# Patient Record
Sex: FEMALE | Race: WHITE
[De-identification: names, ages, dates, MRNs, and addresses within clinical notes are randomized per-mention and may not be internally consistent; named-entity substitution may affect disease eponyms.]

---

## 2021-02-21 ENCOUNTER — HOSPITAL ENCOUNTER (EMERGENCY)
Dept: HOSPITAL 63 - ER | Age: 41
Discharge: HOME | End: 2021-02-21
Payer: SELF-PAY

## 2021-02-21 VITALS — HEIGHT: 65 IN | WEIGHT: 125.66 LBS | BODY MASS INDEX: 20.94 KG/M2

## 2021-02-21 VITALS — DIASTOLIC BLOOD PRESSURE: 70 MMHG | SYSTOLIC BLOOD PRESSURE: 123 MMHG

## 2021-02-21 DIAGNOSIS — F41.9: Primary | ICD-10-CM

## 2021-02-21 DIAGNOSIS — Z76.0: ICD-10-CM

## 2021-02-21 DIAGNOSIS — F32.9: ICD-10-CM

## 2021-02-21 PROCEDURE — 99283 EMERGENCY DEPT VISIT LOW MDM: CPT

## 2021-02-21 NOTE — PHYS DOC
Past History


Past Medical History:  Anxiety, Depression


Past Surgical History:  No Surgical History


Alcohol Use:  None





Adult General


Chief Complaint


Chief Complaint:  ANXIETY/PANIC ATTACK





HPI


HPI





Patient is a 40-year-old female presents emergency department complaining of 

anxiety and requesting her anxiety medication Celexa be refilled.  Patient 

states that she has been out of Celexa for several months related to loss of job

if she could not afford it.  Patient states she has a job now and can afford to 

fill her medications.  Patient states she used to live in [place] and see a 

psychiatrist in [place] that would prescribe her medication, patient states 

that she lives in [place] and [facility]

ation and medication consideration on 03/08/2021.  Patient denies homicidal 

suicidal ideations.  Patient denies chest pain, shortness of breath, visual 

changes, neurological problems, cough or congestion.  Patient denies any other 

physical complaints or physical concerns.





Review of Systems


Review of Systems





14 body systems of review of systems have been reviewed.  See HPI for pertinent 

positives and negative responses, otherwise all other systems are negative, 

nonpertinent or noncontributory.





Current Medications


Current Medications





Current Medications








 Medications


  (Trade)  Dose


 Ordered  Sig/Linda  Start Time


 Stop Time Status Last Admin


Dose Admin


 


 Lorazepam


  (Ativan)  1 mg  1X  ONCE  2/21/21 15:45


 2/21/21 15:46 UNV  














Physical Exam


Physical Exam





Constitutional: Well developed, well nourished, no acute distress, non-toxic 

appearance. 


HENT: Normocephalic, atraumatic, bilateral external ears normal, oropharynx 

moist, no oral exudates, nose normal. 


Eyes: PERRLA, EOMI, conjunctiva normal, no discharge.  


Neck: Normal range of motion, no tenderness, supple, no stridor.  


Cardiovascular:Heart rate regular rhythm, no murmur, heart sounds S1-S2 

auscultation.


Lungs & Thorax:  Bilateral breath sounds clear to auscultation all lung fields.


Abdomen: Bowel sounds normal, soft, no tenderness, no masses, no pulsatile 

masses.  


Skin: Warm, dry, no erythema, no rash.  


Back: No tenderness, no CVA tenderness.  


Extremities: No tenderness, no cyanosis, no clubbing, ROM intact, no edema.  


Neurologic: Alert and oriented X 3, normal motor function, normal sensory 

function, no focal deficits noted. 


Psychologic: Affect normal, judgement normal, mood normal.





Current Patient Data


Vital Signs





                                   Vital Signs








  Date Time  Temp Pulse Resp B/P (MAP) Pulse Ox O2 Delivery O2 Flow Rate FiO2


 


2/21/21 15:08 98.5 76 20 123/70 (87) 98 Room Air  











EKG


EKG


[]





Radiology/Procedures


Radiology/Procedures


[]





Heart Score


Risk Factors:


Risk Factors:  DM, Current or recent (<one month) smoker, HTN, HLP, family 

history of CAD, obesity.


Risk Scores:


Risk Factors:  DM, Current or recent (<one month) smoker, HTN, HLP, family hist

ory of CAD, obesity.





Course & Med Decision Making


Course & Med Decision Making


Pertinent Labs and Imaging studies reviewed. (See chart for details)





40-year-old female, vital signs reviewed, presents emergency department for 

refill of her Celexa.  Patient has appointment to [facility].  We will give 30-day supply of 20 mg Celexa p.o. 

daily, will give 1 mg p.o. Ativan in emergency department.





Patient gave verbal understanding of discharge home instructions, will keep 

appointment with [facility] for future prescriptions for her psychiatric 

medications, gave verbal understanding return to ER precautions or concerns, was

 discharged home without incident.





Dragon Disclaimer


Dragon Disclaimer


This electronic medical record was generated, in whole or in part, using a voice

 recognition dictation system.





Departure


Departure:


Impression:  


   Primary Impression:  


   Anxiety


   Additional Impression:  


   Encounter for medication refill


Disposition:  01 DC HOME SELF CARE/HOMELESS


Condition:  GOOD


Referrals:  


PCP,UNKNOWN (PCP)





Additional Instructions:  


Take medications as prescribed, please keep your appointment with the [facility] for ongoing prescription refill of your Celexa.  Return to the emergency 

department for worsening symptoms or other concerns.





EMERGENCY DEPARTMENT GENERAL DISCHARGE INSTRUCTIONS





Thank you for coming to [facility] (ED) today and trusting us

 with you 


care.  We trust that you had a positivie experience in our Emergency Department.

  If you 


wish to speak to the department management, you may call the director at 

[phone].





YOUR FOLLOW UP INSTRUCTIONS ARE AS FOLLOWS:





1.  Do you have a private Doctor?  If you do not have a private doctor, please 

ask for a 


resource list of physicians or clinics that may be able to assist you with 

follow up care.





2.  The Emergency Physician has interpreted your x-rays.  The X-Ray specialist 

will also 


review them.  If there is a change in the findings, you will be notified in 48 

hours when at 


all possible.





3.  A lab test or culture has been done, your results will be reviewed and you 

will be 


notified if you need a change in treatment.





ADDITIONAL INSTRUCTIONS AND INFORMATION:





1.  Your care today has been supervised by a physician who is specially trained 

in emergency 


care.  Many problems require more than one evaluation for a complete diagnosis 

and 


treatment.  We recommend that you schedule your follow up appointment as 

recommended to 


ensure complete treatment of you illness or injury.  If you are unable to obtain

 follow up 


care and continue to have a problem, or if your condition worsens, we recommend 

that you 


return to the ED.





2.  We are not able to safely determine your condition over the phone nor are we

 able to 


give sound medical advice over the phone.  For these safety reasons, if you call

 for medical 


advice we will ask you to come to the ED for further evaluation.





3.  If you have any questions regarding these discharge instructions please call

 the ED at 


[phone].





SAFETY INFORMATION:





In the interest of safety, wellness, and injury prevention; we encourage you to 

wear your 


sealbelt, if you smoke; quite smoking, and we encourage family to use a 

protective helmet 


for bicycling and other sporting events that present an increased risk for head 

injury.





IF YOUR SYMPTOMS WORSEN OR NEW SYMPTOMS DEVELOP, OR YOU HAVE CONCERNS ABOUT YOUR

 CONDITION; 


OR IF YOUR CONDITION WORSENS WHILE YOU ARE WAITING FOR YOUR FOLLOW UP 

APPOINTMENT; EITHER 


CONTACT YOUR PRIMARY CARE DOCTOR, THE PHYSICIAN WHOSE NAME AND NUMBER YOU WERE 

GIVEN, OR 


RETURN TO THE ED IMMEDIATELY.


Scripts


Citalopram Hydrobromide (CELEXA) 20 Mg Tablet


1 TAB PO DAILY for ANXIETY, #30 TAB 0 Refills


   Prov: ORALIA ESTRADA         2/21/21





Problem Qualifiers











ORALIA ESTRADA       Feb 21, 2021 15:55

## 2021-02-28 ENCOUNTER — HOSPITAL ENCOUNTER (EMERGENCY)
Dept: HOSPITAL 63 - ER | Age: 41
Discharge: HOME | End: 2021-02-28
Payer: SELF-PAY

## 2021-02-28 VITALS — WEIGHT: 145.51 LBS | BODY MASS INDEX: 24.24 KG/M2 | HEIGHT: 65 IN

## 2021-02-28 VITALS — DIASTOLIC BLOOD PRESSURE: 65 MMHG | SYSTOLIC BLOOD PRESSURE: 124 MMHG

## 2021-02-28 DIAGNOSIS — F32.9: ICD-10-CM

## 2021-02-28 DIAGNOSIS — F41.9: Primary | ICD-10-CM

## 2021-02-28 DIAGNOSIS — Z76.0: ICD-10-CM

## 2021-02-28 LAB
AMPHETAMINE/METHAMPHETAMINE: (no result)
APTT PPP: YELLOW S
BACTERIA #/AREA URNS HPF: (no result) /HPF
BARBITURATES UR-MCNC: (no result) UG/ML
BENZODIAZ UR-MCNC: (no result) UG/L
BILIRUB UR QL STRIP: (no result)
CANNABINOIDS UR-MCNC: (no result) UG/L
COCAINE UR-MCNC: (no result) NG/ML
FIBRINOGEN PPP-MCNC: CLEAR MG/DL
GLUCOSE UR STRIP-MCNC: (no result) MG/DL
METHADONE SERPL-MCNC: (no result) NG/ML
NITRITE UR QL STRIP: (no result)
OPIATES UR-MCNC: (no result) NG/ML
PCP SERPL-MCNC: (no result) MG/DL
RBC #/AREA URNS HPF: (no result) /HPF (ref 0–2)
SP GR UR STRIP: 1.02
SQUAMOUS #/AREA URNS LPF: (no result) /LPF
UROBILINOGEN UR-MCNC: 0.2 MG/DL
WBC #/AREA URNS HPF: (no result) /HPF (ref 0–4)

## 2021-02-28 PROCEDURE — 81001 URINALYSIS AUTO W/SCOPE: CPT

## 2021-02-28 PROCEDURE — 81025 URINE PREGNANCY TEST: CPT

## 2021-02-28 PROCEDURE — 36415 COLL VENOUS BLD VENIPUNCTURE: CPT

## 2021-02-28 PROCEDURE — 80307 DRUG TEST PRSMV CHEM ANLYZR: CPT

## 2021-02-28 PROCEDURE — 99284 EMERGENCY DEPT VISIT MOD MDM: CPT

## 2021-02-28 NOTE — PHYS DOC
Past History


Past Medical History:  Anxiety, Depression


Past Surgical History:  No Surgical History


Alcohol Use:  None





General Adult


EDM:


Chief Complaint:  ANXIETY/PANIC ATTACK





HPI:


HPI:


"I was here the other day for depression anxiety.... Back on the 21st.... They 

did give me a prescription for Celexa.... I have used it before when I get 

depression and anxiety.... However I have not filled it because of financial 

issues.... I do plan to fill it here this coming Tuesday when I get paid.... It 

just been having more anxiety today... I know that is what is  going on ..I 

would  like to get treatment for my anxiety here in the ER... or at least one 

tablet...of Celexa.."








Patient is a 40 year old female who presents with with complaints of anxiety.  

Patient recently ran out of her Celexa.  Was seen on 221 and did receive a 30-

day supply prescription for Celexa.  Patient has not filled this prescription as

yet.  [facility].  Does have a follow-up 

appointment on 3/8/2021.  Patient denies any suicidal ideations.  Patient denies

any chest pain, dyspnea, hallucinations, delusions, fever, chills, pain.  

Patient states at times when she has anxiety attacks she seems like her heart 

races.  Currently she is in sinus rhythm on monitor.  At a rate of approximately

65-70.  No acute morphology noted on monitor.  Patient requesting a dose of 

Celexa until she can follow-up tomorrow at the counseling center and see if her 

appointment could be moved up.  Patient denies any illicit drugs.  No history of

recent travel but has moved from [place] to the [facility] border.





Review of Systems:


Review of Systems:


Constitutional:  Denies fever or chills 


Eyes:  Denies change in visual acuity 


HENT:  Denies nasal congestion or sore throat 


Respiratory:  Denies cough or shortness of breath 


Cardiovascular:  Denies chest pain or edema 


GI:  Denies abdominal pain, nausea, vomiting, bloody stools or diarrhea 


: Denies dysuria 


Musculoskeletal:  Denies back pain or joint pain 


Integument:  Denies rash 


Neurologic:  Denies headache, focal weakness or sensory changes 


Endocrine:  Denies polyuria or polydipsia 


Lymphatic:  Denies swollen glands 


Psychiatric: History of depression and anxiety.  Denies any suicidal ideation.  

Denies any homicidal ideation.  Denies any hallucinations or delusions.





Family History:


Family History:


Noncontributory to presentation





Current Medications:


Current Meds:





Current Medications








 Medications


  (Trade)  Dose


 Ordered  Sig/Linda  Start Time


 Stop Time Status Last Admin


Dose Admin


 


 Citalopram


 Hydrobromide


  (CeleXA)  20 mg  1X  ONCE  2/28/21 18:30


 2/28/21 18:32 DC  














Allergies:


Allergies:





Allergies








Coded Allergies Type Severity Reaction Last Updated Verified


 


  No Known Drug Allergies    2/21/21 No











Physical Exam:


PE:





Constitutional: no acute distress, non-toxic appearance. []


HENT: Normocephalic, atraumatic, bilateral external ears normal, oropharynx 

moist, no oral exudates, nose normal. []


Eyes: PERRLA, EOMI, conjunctiva normal, no discharge.  Strabismus .


Neck: Normal range of motion, no tenderness, supple, no stridor. [] 


Cardiovascular:Heart rate regular rhythm, no murmur []


Lungs & Thorax:  Bilateral breath sounds equal apex on auscultation []


Abdomen: Bowel sounds normal, soft, no tenderness, no masses, no pulsatile 

masses. [] 


Skin: Warm, dry, no erythema, no rash. [] 


Back: No tenderness, no CVA tenderness. [] 


Extremities: No tenderness, no cyanosis, no clubbing, ROM intact, no edema. [] 


Neurologic: Alert and oriented X 3, moves all extremities on request, does have 

distal sensory,, no focal deficits noted. [] DTRs +2 brachial and patella.  Is 

ambulatory without problems.  No drift.   equal.


Psychologic: Affect anxious , judgement normal, mood reports depression but no 

suicidal ideations or homicidal ideations.  Denies hallucinations or delusions





Current Patient Data:


Labs:





                                Laboratory Tests








Test


 2/28/21


18:33


 


POC Urine HCG, Qualitative


 hcg negative


(Negative)








Vital Signs:





                                   Vital Signs








  Date Time  Temp Pulse Resp B/P (MAP) Pulse Ox O2 Delivery O2 Flow Rate FiO2


 


2/28/21 17:53 98.4 75 16 126/67 (86) 99 Room Air  











EKG:


EKG:


My interpretation EKG shows a sinus rhythm at 66 bpm.  There is a mild right 

bundle branch block.  But there is no findings acute STEMI or contralateral 

changes.  []





Radiology/Procedures:


Radiology/Procedures:


Declined by patient []





Heart Score:


HEART Score for Chest Pain:  








HEART Score for Chest Pain Response (Comments) Value


 


History Slighlty/Non-Suspicious 0


 


ECG Normal 0


 


Age < 45 0


 


Risk Factors No Risk Factors 0


 


Total  0








Risk Factors:


Risk Factors:  DM, Current or recent (<one month) smoker, HTN, HLP, family 

history of CAD, obesity.


Risk Scores:


Score 0 - 3:  2.5% MACE over next 6 weeks - Discharge Home


Score 4 - 6:  20.3% MACE over next 6 weeks - Admit for Clinical Observation


Score 7 - 10:  72.7% MACE over next 6 weeks - Early Invasive Strategies





Course & Med Decision Making:


Course & Med Decision Making


Pertinent Labs and Imaging studies reviewed. (See chart for details)





Encourage patient to fill her prescription.  Encourage patient to keep follow-up

 with counseling center.  Patient return if any concerns.





Impression:





1.  Request for med refill


2.  History of anxiety disorder


3.  History depression








[]





Dragon Disclaimer:


Dragon Disclaimer:


This electronic medical record was generated, in whole or in part, using a voice

 recognition dictation system.





Departure


Departure:


Referrals:  


PCP,NO (PCP)





Dragon Disclaimer


This chart was dictated in whole or in part using Voice Recognition software in 

a busy, high-work load, and often noisy Emergency Department environment.  It 

may contain unintended and wholly unrecognized errors or omissions.





Dragon Disclaimer


This chart was dictated in whole or in part using Voice Recognition software in 

a busy, high-work load, and often noisy Emergency Department environment.  It 

may contain unintended and wholly unrecognized errors or omissions.











ABBE SAAVEDRA MD           Feb 28, 2021 18:46

## 2021-03-01 NOTE — EKG
[facility] [address], [place] [address]

Test Date:    2021               Test Time:    18:07:28

Pat Name:     ARIELLE GONZALEZ           Department:   

Patient ID:   [id number]           Room:          

Gender:       F                        Technician:   SAMANTHA

:          1980               Requested By: ABBE SAAVEDRA

Order Number: [id number]            Reading MD:     

                                 Measurements

Intervals                              Axis          

Rate:         66                       P:            50

NJ:           136                      QRS:          31

QRSD:         80                       T:            52

QT:           382                                    

QTc:          402                                    

                           Interpretive Statements

SINUS RHYTHM

INCOMPLETE RIGHT BUNDLE BRANCH BLOCK

OTHERWISE NORMAL ECG

RI6.02

No previous ECG available for comparison

## 2021-03-20 ENCOUNTER — HOSPITAL ENCOUNTER (EMERGENCY)
Dept: HOSPITAL 63 - ER | Age: 41
Discharge: PSYCHIATRIC OR SUBSTANCE USE FACILITY | End: 2021-03-20
Payer: SELF-PAY

## 2021-03-20 VITALS — WEIGHT: 145.51 LBS | HEIGHT: 65 IN | BODY MASS INDEX: 24.24 KG/M2

## 2021-03-20 VITALS — DIASTOLIC BLOOD PRESSURE: 86 MMHG | SYSTOLIC BLOOD PRESSURE: 130 MMHG

## 2021-03-20 DIAGNOSIS — R45.851: Primary | ICD-10-CM

## 2021-03-20 DIAGNOSIS — Z91.5: ICD-10-CM

## 2021-03-20 DIAGNOSIS — F32.9: ICD-10-CM

## 2021-03-20 DIAGNOSIS — F41.9: ICD-10-CM

## 2021-03-20 LAB
ACETAMIN: < 2 MCG/ML (ref 10–30)
AMPHETAMINE/METHAMPHETAMINE: (no result)
ANION GAP SERPL CALC-SCNC: 8 MMOL/L (ref 6–14)
BARBITURATES UR-MCNC: (no result) UG/ML
BASOPHILS # BLD AUTO: 0.1 X10^3/UL (ref 0–0.2)
BASOPHILS NFR BLD: 1 % (ref 0–3)
BENZODIAZ UR-MCNC: (no result) UG/L
CA-I SERPL ISE-MCNC: 8 MG/DL (ref 7–20)
CALCIUM SERPL-MCNC: 8.9 MG/DL (ref 8.5–10.1)
CANNABINOIDS UR-MCNC: (no result) UG/L
CHLORIDE SERPL-SCNC: 105 MMOL/L (ref 98–107)
CO2 SERPL-SCNC: 27 MMOL/L (ref 21–32)
COCAINE UR-MCNC: (no result) NG/ML
CREAT SERPL-MCNC: 0.8 MG/DL (ref 0.6–1)
EOSINOPHIL NFR BLD: 0.4 X10^3/UL (ref 0–0.7)
EOSINOPHIL NFR BLD: 5 % (ref 0–3)
ERYTHROCYTE [DISTWIDTH] IN BLOOD BY AUTOMATED COUNT: 13.1 % (ref 11.5–14.5)
ETHANOL SERPL-MCNC: < 10 MG/DL (ref 0–10)
GFR SERPLBLD BASED ON 1.73 SQ M-ARVRAT: 79.9 ML/MIN
GLUCOSE SERPL-MCNC: 102 MG/DL (ref 70–99)
HCT VFR BLD CALC: 39 % (ref 36–47)
HGB BLD-MCNC: 13 G/DL (ref 12–15.5)
LYMPHOCYTES # BLD: 1.4 X10^3/UL (ref 1–4.8)
LYMPHOCYTES NFR BLD AUTO: 18 % (ref 24–48)
MCH RBC QN AUTO: 30 PG (ref 25–35)
MCHC RBC AUTO-ENTMCNC: 33 G/DL (ref 31–37)
MCV RBC AUTO: 91 FL (ref 79–100)
METHADONE SERPL-MCNC: (no result) NG/ML
MONO #: 0.9 X10^3/UL (ref 0–1.1)
MONOCYTES NFR BLD: 11 % (ref 0–9)
NEUT #: 5.1 X10^3UL (ref 1.8–7.7)
NEUTROPHILS NFR BLD AUTO: 65 % (ref 31–73)
OPIATES UR-MCNC: (no result) NG/ML
PCP SERPL-MCNC: (no result) MG/DL
PLATELET # BLD AUTO: 359 X10^3/UL (ref 140–400)
POTASSIUM SERPL-SCNC: 3.3 MMOL/L (ref 3.5–5.1)
RBC # BLD AUTO: 4.31 X10^6/UL (ref 3.5–5.4)
SALIC: < 2.8 MG/DL (ref 2.8–20)
SODIUM SERPL-SCNC: 140 MMOL/L (ref 136–145)
WBC # BLD AUTO: 7.9 X10^3/UL (ref 4–11)

## 2021-03-20 PROCEDURE — 99285 EMERGENCY DEPT VISIT HI MDM: CPT

## 2021-03-20 PROCEDURE — 93005 ELECTROCARDIOGRAM TRACING: CPT

## 2021-03-20 PROCEDURE — 80329 ANALGESICS NON-OPIOID 1 OR 2: CPT

## 2021-03-20 PROCEDURE — G0480 DRUG TEST DEF 1-7 CLASSES: HCPCS

## 2021-03-20 PROCEDURE — 80048 BASIC METABOLIC PNL TOTAL CA: CPT

## 2021-03-20 PROCEDURE — 36415 COLL VENOUS BLD VENIPUNCTURE: CPT

## 2021-03-20 PROCEDURE — 85025 COMPLETE CBC W/AUTO DIFF WBC: CPT

## 2021-03-20 PROCEDURE — 81025 URINE PREGNANCY TEST: CPT

## 2021-03-20 PROCEDURE — 80307 DRUG TEST PRSMV CHEM ANLYZR: CPT

## 2021-03-20 NOTE — PHYS DOC
Past History


Past Medical History:  Anxiety, Depression


Past Surgical History:  No Surgical History


Alcohol Use:  None





Adult General


HPI


HPI





Patient is a 40-year-old female presenting via POV for suicidal ideation.  

Patient reports she started feeling suicidal today with plan to commit suicide 

by pill overdose or using a steak knife.  States she suffered a "tumultuous 

break-up "of a longtime partner 3 days ago and has been severely depressed ever 

since.  Has history of suicidal attempts and subsequent inpatient psychiatric 

treatment in the past, both attempts were via overdose using pills in 2001 and 

2016.  Denies any illicit drug use, alcohol use or other concerning ingestions. 

Has no prescribed medications at home.  She came here because she "wants relief 

".





Review of Systems


Review of Systems


Fourteen body systems of review of systems have been reviewed. See HPI for 

pertinent positives and negative responses, other wise all other systems are 

negative, non-pertinent or non-contributory





Allergies


Allergies





Allergies








Coded Allergies Type Severity Reaction Last Updated Verified


 


  No Known Drug Allergies    2/21/21 No











Physical Exam


Physical Exam


Constitutional: Well developed, well nourished, no acute distress, non-toxic 

appearance.  Tearful


HENT: Normocephalic, atraumatic, bilateral external ears normal, oropharynx 

moist, no oral exudates, nose normal. 


Eyes: PERRLA, EOMI, conjunctiva normal, no discharge.  


Neck: Normal range of motion, no tenderness, supple, no stridor.  


Cardiovascular: Heart rate regular, sinus rhythm, no murmurs rubs or gallops


Lungs & Thorax:  Bilateral breath sounds clear to auscultation 


Abdomen: Bowel sounds normal, soft, no tenderness, no masses, no pulsatile 

masses.  Nonsurgical abdomen, no peritoneal signs


Skin: Warm, dry, no erythema, no rash.  


Back: No tenderness, no CVA tenderness.  


Extremities: No tenderness, no cyanosis, no clubbing, ROM intact, no edema.  


Neurologic: Alert and oriented X 3, grossly normal motor & sensory function, no 

focal deficits noted. 


Psychologic: Tearful affect, depressed mood,





Current Patient Data


Vital Signs





Vital Signs








  Date Time  Temp Pulse Resp B/P (MAP) Pulse Ox O2 Delivery O2 Flow Rate FiO2


 


3/20/21 11:12 97.9 82 18 130/86 (101) 96   








Lab Results





Laboratory Tests








Test


 3/20/21


10:18 3/20/21


10:29


 


White Blood Count 7.9 x10^3/uL  


 


Red Blood Count 4.31 x10^6/uL  


 


Hemoglobin 13.0 g/dL  


 


Hematocrit 39.0 %  


 


Mean Corpuscular Volume 91 fL  


 


Mean Corpuscular Hemoglobin 30 pg  


 


Mean Corpuscular Hemoglobin


Concent 33 g/dL 


 





 


Red Cell Distribution Width 13.1 %  


 


Platelet Count 359 x10^3/uL  


 


Neutrophils (%) (Auto) 65 %  


 


Lymphocytes (%) (Auto) 18 %  


 


Monocytes (%) (Auto) 11 %  


 


Eosinophils (%) (Auto) 5 %  


 


Basophils (%) (Auto) 1 %  


 


Neutrophils # (Auto) 5.1 x10^3uL  


 


Lymphocytes # (Auto) 1.4 x10^3/uL  


 


Monocytes # (Auto) 0.9 x10^3/uL  


 


Eosinophils # (Auto) 0.4 x10^3/uL  


 


Basophils # (Auto) 0.1 x10^3/uL  


 


Sodium Level 140 mmol/L  


 


Potassium Level 3.3 mmol/L  


 


Chloride Level 105 mmol/L  


 


Carbon Dioxide Level 27 mmol/L  


 


Anion Gap 8  


 


Blood Urea Nitrogen 8 mg/dL  


 


Creatinine 0.8 mg/dL  


 


Estimated GFR


(Cockcroft-Gault) 79.9 


 





 


Glucose Level 102 mg/dL  


 


Calcium Level 8.9 mg/dL  


 


Salicylates Level < 2.8 mg/dL  


 


Salicylate Last Dose Date Unknown  


 


Salicylate Last Dose Time Unknown  


 


Urine Opiates Screen Neg  


 


Urine Methadone Screen Neg  


 


Acetaminophen Level < 2 mcg/mL  


 


Acetaminophen Last Dose Date Unknown  


 


Acetaminophen Last Dose Time Unknown  


 


Urine Barbiturates Neg  


 


Urine Phencyclidine Screen Neg  


 


Urine


Amphetamine/Methamphetamine Neg 


 





 


Urine Benzodiazepines Screen Neg  


 


Urine Cocaine Screen Neg  


 


Urine Cannabinoids Screen Neg  


 


Ethyl Alcohol Level < 10 mg/dL  


 


Urine Ethyl Alcohol Neg  


 


Bedside Urine HCG, Qualitative  hcg negative 











EKG


EKG


EKG ordered and interpreted by myself at 1025 hrs. as sinus rhythm at 70 bpm, 

unremarkable intervals, no axis deviation, no acute ischemic findings, no STEMI





Radiology/Procedures


Radiology/Procedures


[]





Heart Score


C/O Chest Pain:  No


Risk Factors:


Risk Factors:  DM, Current or recent (<one month) smoker, HTN, HLP, family 

history of CAD, obesity.


Risk Scores:


Risk Factors:  DM, Current or recent (<one month) smoker, HTN, HLP, family 

history of CAD, obesity.





Course & Med Decision Making


Course & Med Decision Making


Hemodynamically stable patient with history concerning for active SI.  Physical 

exam nonconcerning.  Comprehensive ER work-up nonconcerning for any infectious 

abnormalities


PAT team consulted once patient was medically cleared while in ER and came to 

evaluate patient.  They agree with ongoing active SI that patient remains at 

risk to self if discharged home.  All in agreement for discharge to [facility] for 

continued care


Patient updated on proposed plan of care that included discharge to [facility], she 

remained agreeable.  All questions and concerns addressed prior to EMS transport

 to [facility] for continued mental health treatment





Dragon Disclaimer


Dragon Disclaimer


This electronic medical record was generated, in whole or in part, using a voice

 recognition dictation system.





Departure


Departure:


Impression:  


   Primary Impression:  


   Suicidal ideation


Disposition:  66 DC/TRF TO PSYCH HOSP ([facility])


Admitting Physician:  Other ([facility])


Condition:  STABLE


Referrals:  


PCP,NO (PCP)











THEODORE GRIDER DO                 Mar 20, 2021 10:04

## 2021-03-20 NOTE — EKG
[facility] [address], [place] [address]

Test Date:    2021               Test Time:    10:19:27

Pat Name:     ARIELLE GONZALEZ           Department:   

Patient ID:   [id number]           Room:          

Gender:       F                        Technician:   SAMANTHA

:          1980               Requested By: THEODORE GRIDER

Order Number: [id number]            Reading MD:     

                                 Measurements

Intervals                              Axis          

Rate:         70                       P:            56

PA:           126                      QRS:          31

QRSD:         80                       T:            49

QT:           392                                    

QTc:          426                                    

                           Interpretive Statements

SINUS RHYTHM

NORMAL ECG

RI6.02

No previous ECG available for comparison

## 2021-04-08 ENCOUNTER — HOSPITAL ENCOUNTER (EMERGENCY)
Dept: HOSPITAL 63 - ER | Age: 41
Discharge: HOME | End: 2021-04-08
Payer: SELF-PAY

## 2021-04-08 VITALS
BODY MASS INDEX: 24.24 KG/M2 | WEIGHT: 145.51 LBS | HEIGHT: 65 IN | DIASTOLIC BLOOD PRESSURE: 67 MMHG | SYSTOLIC BLOOD PRESSURE: 107 MMHG

## 2021-04-08 DIAGNOSIS — F32.9: ICD-10-CM

## 2021-04-08 DIAGNOSIS — F41.9: Primary | ICD-10-CM

## 2021-04-08 PROCEDURE — 99281 EMR DPT VST MAYX REQ PHY/QHP: CPT

## 2021-04-08 NOTE — PHYS DOC
Past History


Past Medical History:  Anxiety, Depression


Past Surgical History:  No Surgical History


Alcohol Use:  None





Adult General


Chief Complaint


Chief Complaint:  ANXIETY/PANIC ATTACK





HPI


HPI





Patient is a 39yo female presenting for anxiety. This is a chronic problem. This

is her 3rd visit in past 4 weeks. She denies SI/HI. Reports home stressors have 

been continuing to bother her. [facility] She 

has not taken any medications previously prescribed to her for anxiety. She is 

here because "it feels like my safe haven...I just don't know what to do"





Review of Systems


Review of Systems


Fourteen body systems of review of systems have been reviewed. See HPI for 

pertinent positives and negative responses, other wise all other systems are 

negative, non-pertinent or non-contributory





Allergies


Allergies





Allergies








Coded Allergies Type Severity Reaction Last Updated Verified


 


  No Known Drug Allergies    2/21/21 No











Physical Exam


Physical Exam


Constitutional: Well developed, well nourished, no acute distress, non-toxic 

appearance. 


HENT: Normocephalic, atraumatic, bilateral external ears normal, oropharynx 

moist, no oral exudates, nose normal. 


Eyes: PERRLA, EOMI, conjunctiva normal, no discharge.  Left exotropia


Neck: Normal range of motion, no tenderness, supple, no stridor.  


Cardiovascular: Heart rate regular per monitor


Lungs & Thorax: No respiratory distress or accessory muscle use, bilateral chest

rise


Abdomen: Abdomen soft, non-tender, bowel sounds present in all quadrants, no 

guarding or rebound, nonacute abdomen.


Skin: Warm, dry, no erythema, no rash.  


Back: No tenderness, no CVA tenderness.  


Extremities: No tenderness, no cyanosis, no clubbing, ROM intact, no edema.  


Neurologic: Alert and oriented X 3, grossly normal motor & sensory function, no 

focal deficits noted. 


Psychologic: Flat affect, depressed mood





Current Patient Data


Vital Signs





                                   Vital Signs








  Date Time  Temp Pulse Resp B/P (MAP) Pulse Ox O2 Delivery O2 Flow Rate FiO2


 


4/8/21 05:35 98.1 77 18 107/67 (80) 100 Room Air  











EKG


EKG


[]





Radiology/Procedures


Radiology/Procedures


[]





Heart Score


C/O Chest Pain:  No


Risk Factors:


Risk Factors:  DM, Current or recent (<one month) smoker, HTN, HLP, family 

history of CAD, obesity.


Risk Scores:


Risk Factors:  DM, Current or recent (<one month) smoker, HTN, HLP, family 

history of CAD, obesity.





Course & Med Decision Making


Course & Med Decision Making


Hemodynamically stable, HPI and PE non-concerning.  No HI/SI.  Here for chronic 

complaints.  No indication for further work-up nor intervention in ER setting


Discussed need for patient to establish with [facility], educated her on 

crisis hours and need to establish care for continuity of care for her chronic 

complaints.  She will benefit from therapy and likely medication


Patient amenable to ER departure and returning home until she can be seen at 

[facility] in upcoming 3 hours.  She feels safe at home physically, she has

 a safety plan in place that was already previously established


Strict return precautions discussed with good understanding, all questions and 

concerns addressed prior to ER departure





Nguyen Disclaimer


Dragon Disclaimer


This electronic medical record was generated, in whole or in part, using a voice

 recognition dictation system.





Departure


Departure:


Impression:  


   Primary Impression:  


   Anxiety


Disposition:  01 DC HOME SELF CARE/HOMELESS


Condition:  STABLE


Referrals:  


PCP,NO (PCP)


Patient Instructions:  Anxiety and Panic Attacks





Additional Instructions:  


As discussed prior to ER departure, please follow-up with [facility] for 

Crisis Eval this morning at 0900am. They accept walk-ins. As discussed, this 

would be best for you to have a formal evaluation, develop relationships and 

establish care for continuity of care. If any concerning signs or symptoms 

present prior to outpatient follow-up please don't hesitate to come back for 

repeat evaluation.











THEODORE GRIDER DO                  Apr 8, 2021 06:28